# Patient Record
(demographics unavailable — no encounter records)

---

## 2024-12-13 NOTE — PLAN
[TextEntry] : Plan CKD 3B: Losartan 100 mg Proteinuria: Losartan, low protein diet  Refer to [facility] glomerular clinic for second opinion  Maintain physical activity  Follow up with recommended workups in 6 months

## 2024-12-13 NOTE — RESULTS/DATA
[TextEntry] : Labs: 12/10/24 138/4.3/107/26/28/1.38/gfr60/ca9.5 upcr 613  8/14/24 137/4.6/105/25/24/1.43/gfr57/ca9.1 glu 102 pth 106 upcr0.496  4/16/24 137/4.5/104/25/ 28/ 1.37 egfr 60 ca 9.5/ upcr 0.431 UA + protein  02/13/24 142/4.8/107/27/27/1.68 egfr 47 Ca 9.3 upcr 0.357 UA occult blood + 2 protein +1  137/4.5/108/22/35/1.46 egfr 56 ca 9.1 upcr 0.456  10/12/22  136/4.3/105/24/25/1.5 eGFR 55 ca 9  CBC: 5.4/12.8/260   uPCR   7/9/22  138/4.3/104/26/25/1.5 eGFR 52  Ca 9.4  UPCR 0.33   5/7/2022: 138/4/104/33/BUN 32, creatinine 1.45, eGFR 56 Urine protein creatinine ratio 347 Kidney biopsy: 4/22/22:  IgA nephropathy, moderately advanced Partial and global glomerulosclerosis (1 out of 14, 5 out of 14 ) Focal tubular atrophy with mild interstitial fibrosis and chronic inflammation Arterio and arteriosclerosis, mild to moderate  4/2/2022:Serum protein electrophoresis negative for monoclonal protein. Hepatitis surface antigen negative PTH 75 140/4/107/26, BUN 28, creatinine 1.54, GFR 50ESR 17PR-3 ANCA negative. MPO ANCA negative, uric acid 8.2, ESR 17C3 147, C4 39Rheumatoid factor less than 14Urine protein creatinine ratio 815Urinalysis RBC 3-10   US; 3/28/22 Echogenic liver consistent with hepatic steatosis. RIGHT KIDNEY: 10.7 cm L x 4.6 cm AP x 4.7 cm W. Normal size, morphology and cortical echotexture. No hydronephrosis, shadowing calculi or perinephric fluid. LEFT KIDNEY: 10.7 cm L x 5.4 cm AP x 4.7 cm W. Normal size, morphology and cortical echotexture. No hydronephrosis, shadowing calculi or perinephric fluid. Bladder sonography: Prostate measures 3.3 x 4.2 x 3.6 cm with a volume of 26 cc. Urinary bladder wall is not thickened with a pre-void volume measuring 224 cc. Post void volume measures 6 cc    3/11/22: Chemistry:139/4.4/102/22/25/1.41 ca 9.6 CBC: 7.3/13/274  UA: Protein 100, Blood Moderate  Urinalysis done in office: SG 1.005 pH 5 positive protein positive blood

## 2024-12-13 NOTE — HISTORY OF PRESENT ILLNESS
[FreeTextEntry1] : 12/13/24 Reason for visit Follow up for CKD  Proteinuria Hematuria Biopsy shows IgA nephropathy  No significant interval events His renal function has been stable Proteinuria essentially the same   Kidney biopsy: 4/22/22: IgA nephropathy, moderately advanced Partial and global glomerulosclerosis (1 out of 14, 5 out of 14 ) Focal tubular atrophy with mild interstitial fibrosis and chronic inflammation Arterio and arteriosclerosis, mild to moderate   3/28/2022: Comes for initial evaluation of elevated serum creatinine of 1.41 on 3/11/2022, urinalysis showed protein and blood.  He has no known past medical history and was recently taking omeprazole for abdominal pain for about 4 months.  Denies any rash, denies any significant joint pain.  Denies NSAID use..  Social history: Does not smoke or drink,  has 3 children.  Preventive care: Primary care physician: [facility]

## 2024-12-13 NOTE — REVIEW OF SYSTEMS
[TextEntry] : Review of Systems: general: No weight loss, No recent fever, chills HEENT: no headache, no change in vision or hearing Pulmonary: No SOB, or cough CVS: No chest pain, BALL, or change in exercise tolerance Gastrointestinal: No Nausea/vomiting/constipation/diarrhea : No complaint of dysuria or urinary frequency, no excess foaming Skin: Denies no new rash, lesions, ulcer Musco skeletal: No edema, cyanosis, or new ulcers Neurological: No headache, dizziness, vertigo

## 2024-12-13 NOTE — PHYSICAL EXAM
[TextEntry] : Physical Examination: General: Not in acute distress Head: Normocephalic, no lesions Eyes: JENNA, Fundi grossly normal Chest: Lungs clear, no rales, no rhonchi, no wheezes CVS: S1/S2, RR, no murmurs, no rubs Abdomen: Soft NT/ND, +BS Extremities: No edema, ulcers, or cyanosis Neurological: Alert, awake, no gross focal deficits

## 2025-02-26 NOTE — PHYSICAL EXAM
[General Appearance - Alert] : alert [General Appearance - In No Acute Distress] : in no acute distress [Oropharynx] : the oropharynx was normal [Neck Appearance] : the appearance of the neck was normal [] : no respiratory distress [Respiration, Rhythm And Depth] : normal respiratory rhythm and effort [Auscultation Breath Sounds / Voice Sounds] : lungs were clear to auscultation bilaterally [Heart Rate And Rhythm] : heart rate was normal and rhythm regular [Heart Sounds] : normal S1 and S2 [Heart Sounds Gallop] : no gallops [Murmurs] : no murmurs [Edema] : there was no peripheral edema [Bowel Sounds] : normal bowel sounds [Abdomen Soft] : soft [Abdomen Tenderness] : non-tender [No CVA Tenderness] : no ~M costovertebral angle tenderness [Abnormal Walk] : normal gait [Skin Turgor] : normal skin turgor [Skin Color & Pigmentation] : normal skin color and pigmentation [No Focal Deficits] : no focal deficits [Oriented To Time, Place, And Person] : oriented to person, place, and time

## 2025-02-27 NOTE — HISTORY OF PRESENT ILLNESS
[FreeTextEntry1] : Mr. Ge presents for initial evaluation of IgAN, referred by Dr. Ramos. He is a 57 [facility] with no significant past medical history. He was found to have "abnormal kidney function" and proteinuria in 2022 during routine evaluation with his primary care provider. He was referred to Dr. Ramos who established creatinine 1.41 and protein and hematuria on UA. A kidney biopsy was obtained. We do not have the biopsy report but Dr. 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 He was started on losartan 25 mg daily which has since been titrated up to 100 mg daily as well as fish oil. Scanned laboratory results also show creatinine at 1.38, eGFR 60, normal albumin , normal ESR, and uPCR of 613 mg/g. He's referred for a second opinion regarding management.  The patient reports that he has been told for at least 10 years that he had microhematuria. Based on his description, sounds like he has had urologic work up including cystoscopy which has been found to be unremarkable. The rest of his ROS is generally negative. He denies constitutional symptoms, rhinorrhea, epistaxis, nasal discharge, productive cough, hemoptysis, dyspnea, orthopnea, edema, abdominal pain, N/V/D, rash, arthralgia.  He denies history of G.I. disease/IBD, liver disease, HBV, HCV, infectious disease, critical illness, hospitalizations, cancer. He had EGD and colonoscopy reported to be normal. He had had rare use of NSAIDs remotely before diagnosed with IgA nephropathy (none since diagnosis) but admits to using omeprazole at that time.  He denies family history of hypertension, kidney diseases, dialysis dependence.  He used to smoke cigarettes many years ago, denies use of illicit drugs, does not drink.  4/22/22: IgA nephropathy, moderately advanced Partial and global glomerulosclerosis (1 out of 14, 5 out of 14) Focal tubular atrophy with mild interstitial fibrosis and chronic inflammation Arterio and arteriosclerosis, mild to moderate

## 2025-02-27 NOTE — HISTORY OF PRESENT ILLNESS
[FreeTextEntry1] : Mr. Ge presents for initial evaluation of IgAN, referred by Dr. Ramos. He is a 57 [facility] with no significant past medical history. He was found to have "abnormal kidney function" and proteinuria in 2022 during routine evaluation with his primary care provider. He was referred to Dr. Ramos who established creatinine 1.41 and protein and hematuria on UA. A kidney biopsy was obtained. We do not have the biopsy report but Dr. 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 He was started on losartan 25 mg daily which has since been titrated up to 100 mg daily as well as fish oil. Scanned laboratory results also show creatinine at 1.38, eGFR 60, normal albumin , normal ESR, and uPCR of 613 mg/g. He's referred for a second opinion regarding management.  The patient reports that he has been told for at least 10 years that he had microhematuria. Based on his description, sounds like he has had urologic work up including cystoscopy which has been found to be unremarkable. The rest of his ROS is generally negative. He denies constitutional symptoms, rhinorrhea, epistaxis, nasal discharge, productive cough, hemoptysis, dyspnea, orthopnea, edema, abdominal pain, N/V/D, rash, arthralgia.  He denies history of G.I. disease/IBD, liver disease, HBV, HCV, infectious disease, critical illness, hospitalizations, cancer. He had EGD and colonoscopy reported to be normal. He had had rare use of NSAIDs remotely before diagnosed with IgA nephropathy (none since diagnosis) but admits to using omeprazole at that time.  He denies family history of hypertension, kidney diseases, dialysis dependence.  He used to smoke cigarettes many years ago, denies use of illicit drugs, does not drink.  4/22/22: IgA nephropathy, moderately advanced Partial and global glomerulosclerosis (1 out of 14, 5 out of 14) Focal tubular atrophy with mild interstitial fibrosis and chronic inflammation Arterio and arteriosclerosis, mild to moderate

## 2025-02-27 NOTE — ASSESSMENT
[FreeTextEntry1] : Mr. Ge presents for initial evaluation of IgAN, referred by Dr. Ramos. He is a 57 year-old [place] Male with no significant past medical history, diagnosed with biopsy proven IgAN in 2022 with recent labs in 12/204 showing Cr 1.38, eGFR 60, uPCR 613 mg/g. He has been on losartan since diagnosis, titrated up to 100 mg daily.   IgA Nephropathy: Biopsy proven IgA nephropathy diagnosed in 2022 but likely has had it for many years given his report of microhematuria for at least about 10 years. We do not have the biopsy reports and do not know what the MEST - C score is but he is reported to have moderately advanced disease with "Partial and Global glomerulosclerosis" with mild IFTA. He is tolerating maximum dose of losartan at 100 mg daily with good BP control. We discussed the status of his kidney disease in detail and treatment options available to him depending on lab results from today including budesonide, sparsentan, iptacopan and enrolling in a trial that we are participating in.  - Check labs today - CMP, UA, PCR - Continue losartan - Advised to avoid NSAIDs - Discussed risk of progression of disease and the importance of more aggressive treatment depending on lab results today to mitigate progression of disease - Will obtain a copy of the kidney biopsy - Will consider enrollment into the trial based on lab results  F/U based on lab results

## 2025-02-27 NOTE — REVIEW OF SYSTEMS
[Negative] : Gastrointestinal [Fever] : no fever [Chills] : no chills [Nosebleeds] : no nosebleeds [Nasal Discharge] : no nasal discharge [Heart Rate Is Fast] : the heart rate was not fast [Chest Pain] : no chest pain [Palpitations] : no palpitations [Lower Ext Edema] : no extremity edema [Shortness Of Breath] : no shortness of breath [Wheezing] : no wheezing [SOB on Exertion] : no shortness of breath during exertion [Orthopnea] : no orthopnea [Vomiting] : no vomiting [Constipation] : no constipation [Diarrhea] : no diarrhea [Melena] : no melena [Dysuria] : no dysuria [Nocturia] : no nocturia [Limb Swelling] : no limb swelling [Skin Lesions] : no skin lesions [Skin Wound] : no skin wound [Dizziness] : no dizziness [Muscle Weakness] : no muscle weakness [Easy Bleeding] : no tendency for easy bleeding

## 2025-06-13 NOTE — PLAN
[TextEntry] : Plan CKD 3B: Continue Losartan 100 mg Proteinuria: Continue Losartan, low protein diet  Refer to [facility] glomerular clinic for follow up Maintain physical activity  Follow up with recommended workups in 6 months

## 2025-06-13 NOTE — HISTORY OF PRESENT ILLNESS
[FreeTextEntry1] : 06/13/25 Reason for visit Follow up for IgA nephropathy Proteinuria No significant interval events   12/13/24 Reason for visit Follow up for CKD  Proteinuria Hematuria Biopsy shows IgA nephropathy  No significant interval events His renal function has been stable Proteinuria essentially the same   Kidney biopsy: 4/22/22: IgA nephropathy, moderately advanced Partial and global glomerulosclerosis (1 out of 14, 5 out of 14 ) Focal tubular atrophy with mild interstitial fibrosis and chronic inflammation Arterio and arteriosclerosis, mild to moderate   3/28/2022: Comes for initial evaluation of elevated serum creatinine of 1.41 on 3/11/2022, urinalysis showed protein and blood.  He has no known past medical history and was recently taking omeprazole for abdominal pain for about 4 months.  Denies any rash, denies any significant joint pain.  Denies NSAID use..  Social history: Does not smoke or drink,  has 3 children.  Preventive care: [facility]

## 2025-06-13 NOTE — ASSESSMENT
[FreeTextEntry1] : Assessment: CKD stage 3B: Essentially unchanged  Proteinuria: Still chronic, slightly worsened No peripheral edema/joint pain Continues to have hematuria HTN controlled  Electrolytes are stable

## 2025-06-13 NOTE — REVIEW OF SYSTEMS
4/25/18  1:30 PM  LAB 1 AUD    [facility]    [phone]      4/25/18  1:30 PM  [facility] AUD Np1    [facility]    [phone]      5/11/18  9:45  Shelley Boo,     [facility]    [phone]             [TextEntry] : Review of Systems: general: No weight loss, No recent fever, chills HEENT: no headache, no change in vision or hearing Pulmonary: No SOB, or cough CVS: No chest pain, BALL, or change in exercise tolerance Gastrointestinal: No Nausea/vomiting/constipation/diarrhea : No complaint of dysuria or urinary frequency, no excess foaming Skin: Denies no new rash, lesions, ulcer Musco skeletal: No edema, cyanosis, or new ulcers Neurological: No headache, dizziness, vertigo

## 2025-06-13 NOTE — RESULTS/DATA
[TextEntry] : Labs: 6/10/25 139/4.4/107/27/17/1.36/gfr13/ca9.1 UPCR 0.532 HGB 13.8  12/10/24 138/4.3/107/26/28/1.38/gfr60/ca9.5 upcr 613  8/14/24 137/4.6/105/25/24/1.43/gfr57/ca9.1 glu 102 pth 106 upcr0.496  4/16/24 137/4.5/104/25/ 28/ 1.37 egfr 60 ca 9.5/ upcr 0.431 UA + protein  02/13/24 142/4.8/107/27/27/1.68 egfr 47 Ca 9.3 upcr 0.357 UA occult blood + 2 protein +1  137/4.5/108/22/35/1.46 egfr 56 ca 9.1 upcr 0.456  10/12/22  136/4.3/105/24/25/1.5 eGFR 55 ca 9  CBC: 5.4/12.8/260   uPCR   7/9/22  138/4.3/104/26/25/1.5 eGFR 52  Ca 9.4  UPCR 0.33   5/7/2022: 138/4/104/33/BUN 32, creatinine 1.45, eGFR 56 Urine protein creatinine ratio 347  Kidney biopsy: 4/22/22: IgA nephropathy, moderately advanced Partial and global glomerulosclerosis (1 out of 14, 5 out of 14 ) Focal tubular atrophy with mild interstitial fibrosis and chronic inflammation Arterio and arteriosclerosis, mild to moderate  4/2/2022:Serum protein electrophoresis negative for monoclonal protein. Hepatitis surface antigen negative PTH 75 140/4/107/26, BUN 28, creatinine 1.54, GFR 50ESR 17PR-3 ANCA negative. MPO ANCA negative, uric acid 8.2, ESR 17C3 147, C4 39Rheumatoid factor less than 14Urine protein creatinine ratio 815Urinalysis RBC 3-10   US; 3/28/22 Echogenic liver consistent with hepatic steatosis. RIGHT KIDNEY: 10.7 cm L x 4.6 cm AP x 4.7 cm W. Normal size, morphology and cortical echotexture. No hydronephrosis, shadowing calculi or perinephric fluid. LEFT KIDNEY: 10.7 cm L x 5.4 cm AP x 4.7 cm W. Normal size, morphology and cortical echotexture. No hydronephrosis, shadowing calculi or perinephric fluid. Bladder sonography: Prostate measures 3.3 x 4.2 x 3.6 cm with a volume of 26 cc. Urinary bladder wall is not thickened with a pre-void volume measuring 224 cc. Post void volume measures 6 cc    3/11/22: Chemistry:139/4.4/102/22/25/1.41 ca 9.6 CBC: 7.3/13/274  UA: Protein 100, Blood Moderate  Urinalysis done in office: SG 1.005 pH 5 positive protein positive blood